# Patient Record
Sex: MALE | Employment: OTHER
[De-identification: names, ages, dates, MRNs, and addresses within clinical notes are randomized per-mention and may not be internally consistent; named-entity substitution may affect disease eponyms.]

---

## 2023-08-08 ENCOUNTER — APPOINTMENT (OUTPATIENT)
Facility: HOSPITAL | Age: 68
End: 2023-08-08

## 2023-08-08 ENCOUNTER — HOSPITAL ENCOUNTER (EMERGENCY)
Facility: HOSPITAL | Age: 68
Discharge: HOME | End: 2023-08-08
Attending: EMERGENCY MEDICINE

## 2023-08-08 VITALS
DIASTOLIC BLOOD PRESSURE: 77 MMHG | RESPIRATION RATE: 16 BRPM | HEART RATE: 73 BPM | TEMPERATURE: 98.2 F | OXYGEN SATURATION: 100 % | SYSTOLIC BLOOD PRESSURE: 134 MMHG

## 2023-08-08 DIAGNOSIS — M10.9 ACUTE GOUT OF LEFT HAND, UNSPECIFIED CAUSE: Primary | ICD-10-CM

## 2023-08-08 PROCEDURE — 73120 X-RAY EXAM OF HAND: CPT

## 2023-08-08 PROCEDURE — 99283 EMERGENCY DEPT VISIT LOW MDM: CPT

## 2023-08-08 RX ORDER — NAPROXEN 250 MG/1
250 TABLET ORAL 2 TIMES DAILY WITH MEALS
Qty: 20 TABLET | Refills: 0 | Status: SHIPPED | OUTPATIENT
Start: 2023-08-08 | End: 2023-08-18

## 2023-08-08 RX ORDER — CEPHALEXIN 500 MG/1
500 CAPSULE ORAL 4 TIMES DAILY
Qty: 28 CAPSULE | Refills: 0 | Status: SHIPPED | OUTPATIENT
Start: 2023-08-08 | End: 2023-08-15

## 2023-08-08 ASSESSMENT — ENCOUNTER SYMPTOMS
EYE DISCHARGE: 0
ABDOMINAL PAIN: 0
SHORTNESS OF BREATH: 0

## 2023-08-08 ASSESSMENT — PAIN - FUNCTIONAL ASSESSMENT: PAIN_FUNCTIONAL_ASSESSMENT: NONE - DENIES PAIN

## 2023-08-08 NOTE — ED NOTES
I have reviewed discharge instructions with the patient. The patient verbalized understanding.        Tameka Hussein RN  08/08/23 [id number]

## 2023-08-08 NOTE — ED TRIAGE NOTES
Triage: Pt arrives ambulatory from home with CC of soft tissue swelling and pain to his left hand. He denies specific injury or trauma. Seen at [facility] [place] last week and provided a brace but reports pain and swelling are getting progressively worse.